# Patient Record
Sex: MALE | Race: WHITE | URBAN - METROPOLITAN AREA
[De-identification: names, ages, dates, MRNs, and addresses within clinical notes are randomized per-mention and may not be internally consistent; named-entity substitution may affect disease eponyms.]

---

## 2019-12-10 ENCOUNTER — Encounter (OUTPATIENT)
Dept: URBAN - METROPOLITAN AREA CLINIC 71 | Facility: CLINIC | Age: 74
End: 2019-12-10
Payer: MEDICARE

## 2019-12-10 PROCEDURE — 92014 COMPRE OPH EXAM EST PT 1/>: CPT | Performed by: OPHTHALMOLOGY

## 2019-12-10 PROCEDURE — 92250 FUNDUS PHOTOGRAPHY W/I&R: CPT | Performed by: OPHTHALMOLOGY

## 2020-03-10 ENCOUNTER — OFFICE VISIT (OUTPATIENT)
Dept: URBAN - METROPOLITAN AREA CLINIC 71 | Facility: CLINIC | Age: 75
End: 2020-03-10
Payer: MEDICARE

## 2020-03-10 PROCEDURE — 92014 COMPRE OPH EXAM EST PT 1/>: CPT | Performed by: OPHTHALMOLOGY

## 2020-03-10 ASSESSMENT — INTRAOCULAR PRESSURE
OD: 12
OS: 14

## 2020-03-10 NOTE — IMPRESSION/PLAN
Impression: Age-related nuclear cataract, bilateral: H25.13. Stable. Plan: No recommendation for surgery at this time. Continue to monitor annually for changes and progression.

## 2020-03-10 NOTE — IMPRESSION/PLAN
Impression: Vitreous degeneration, bilateral: H43.813. Stable. Plan: The PVD is stable, and there is no evidence of a retinal tear or detachment on dilated exam with scleral depression. I again reviewed the signs and symptoms of a retinal tear and detachment in detail with the patient, including worsening flashes, new floaters, and development of a shadow/curtain in the peripheral visual field. The patient was advised to call immediately with any changes to [address] [facility] or increase in symptoms.

## 2021-03-02 ENCOUNTER — OFFICE VISIT (OUTPATIENT)
Dept: URBAN - METROPOLITAN AREA CLINIC 71 | Facility: CLINIC | Age: 76
End: 2021-03-02
Payer: MEDICARE

## 2021-03-02 DIAGNOSIS — H43.813 VITREOUS DEGENERATION, BILATERAL: Primary | ICD-10-CM

## 2021-03-02 PROCEDURE — 99212 OFFICE O/P EST SF 10 MIN: CPT | Performed by: OPHTHALMOLOGY

## 2021-03-02 PROCEDURE — 92134 CPTRZ OPH DX IMG PST SGM RTA: CPT | Performed by: OPHTHALMOLOGY

## 2021-03-02 ASSESSMENT — INTRAOCULAR PRESSURE
OS: 10
OD: 9

## 2021-03-02 ASSESSMENT — KERATOMETRY
OS: 43.75
OD: 43.63

## 2021-03-02 NOTE — IMPRESSION/PLAN
Impression: Vitreous degeneration, bilateral: H43.813. OCT ordered and reviewed today. Stable. No holes or tears seen today. Plan: Patient instructed to call if any signs or symptoms of retinal detachment occur. Return annually for dilated exams with OCT testing.

## 2021-03-02 NOTE — IMPRESSION/PLAN
Impression: Age-related nuclear cataract, bilateral: H25.13. Stable. Plan: Continue to monitor for progression. Patient to call if changes or  decrease in vision occurs.

## 2022-03-29 ENCOUNTER — OFFICE VISIT (OUTPATIENT)
Dept: URBAN - METROPOLITAN AREA CLINIC 71 | Facility: CLINIC | Age: 77
End: 2022-03-29
Payer: MEDICARE

## 2022-03-29 DIAGNOSIS — H35.3121 NEXDTVE AGE-RELATED MCLR DEGN, LEFT EYE, EARLY DRY STAGE: ICD-10-CM

## 2022-03-29 DIAGNOSIS — H25.13 AGE-RELATED NUCLEAR CATARACT, BILATERAL: ICD-10-CM

## 2022-03-29 PROCEDURE — 92134 CPTRZ OPH DX IMG PST SGM RTA: CPT | Performed by: OPHTHALMOLOGY

## 2022-03-29 PROCEDURE — 99213 OFFICE O/P EST LOW 20 MIN: CPT | Performed by: OPHTHALMOLOGY

## 2022-03-29 RX ORDER — IRBESARTAN 150 MG/1
150 MG TABLET, FILM COATED ORAL
Qty: 0 | Refills: 0 | Status: ACTIVE
Start: 2022-03-29

## 2022-03-29 ASSESSMENT — INTRAOCULAR PRESSURE
OS: 12
OD: 11

## 2022-03-29 NOTE — IMPRESSION/PLAN
Impression: Nexdtve age-related mclr degn, left eye, early dry stage: H35.3121. New diagnosis. No leakage seen today. Plan: Recommend use of ocular vitamins and monitoring of the amsler grid. Patient to call if any changes in vision or distortions occur. Return in 6 months for dilation and repeat OCT testing.

## 2022-03-29 NOTE — IMPRESSION/PLAN
Impression: Vitreous degeneration, bilateral: H43.813. OCT ordered and reviewed today. Stable. No holes or tears seen today. Plan: Contact office if symptoms worsen, including increased floaters, flashing light, loss of vision or a black curtain blocking your field of vision.

## 2022-09-28 ENCOUNTER — OFFICE VISIT (OUTPATIENT)
Dept: URBAN - METROPOLITAN AREA CLINIC 71 | Facility: CLINIC | Age: 77
End: 2022-09-28
Payer: MEDICARE

## 2022-09-28 DIAGNOSIS — H35.3121 NONEXUDATIVE AGE-RELATED MACULAR DEGENERATION, LEFT EYE, EARLY DRY STAGE: Primary | ICD-10-CM

## 2022-09-28 DIAGNOSIS — H25.11 AGE-RELATED NUCLEAR CATARACT, RIGHT EYE: ICD-10-CM

## 2022-09-28 DIAGNOSIS — H25.812 COMBINED FORMS OF AGE-RELATED CATARACT, LEFT EYE: ICD-10-CM

## 2022-09-28 DIAGNOSIS — H53.453 BILATERAL PERIPHERAL VISUAL FIELD DEFECTS: ICD-10-CM

## 2022-09-28 DIAGNOSIS — H35.373 PUCKERING OF MACULA, BILATERAL: ICD-10-CM

## 2022-09-28 PROCEDURE — 92133 CPTRZD OPH DX IMG PST SGM ON: CPT | Performed by: PHYSICIAN ASSISTANT

## 2022-09-28 PROCEDURE — 92134 CPTRZ OPH DX IMG PST SGM RTA: CPT | Performed by: PHYSICIAN ASSISTANT

## 2022-09-28 PROCEDURE — 92014 COMPRE OPH EXAM EST PT 1/>: CPT | Performed by: PHYSICIAN ASSISTANT

## 2022-09-28 ASSESSMENT — INTRAOCULAR PRESSURE
OD: 10
OS: 9

## 2022-09-28 NOTE — IMPRESSION/PLAN
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 Pt has noted loss of temporal vision that is gradually improving -- suspect may have damage to optic nerves at optic chiasm -- will return in 1-2 weeks for vf. Answered all questions and addressed all concerns.

## 2022-09-28 NOTE — IMPRESSION/PLAN
Impression: Nonexudative age-related macular degeneration, left eye, early dry stage: H35.3121. Plan: Macular degeneration, dry type with stable vision. Will continue to monitor vision and the patient has been instructed to call with any vision changes. Continue AREDS2 vitamins and use of [employer] Grid at home. Answered all questions and addressed all concerns. 

OCT ordered today -- dry OU

## 2022-09-28 NOTE — IMPRESSION/PLAN
Impression: Puckering of macula, bilateral: H35.373. Plan: Discussed that patient has ERM/macular pucker and that this can impact vision. No need for treatment at this time. Will continue to monitor. Answered all questions and addressed all concerns.

## 2022-09-28 NOTE — IMPRESSION/PLAN
Impression: Age-related nuclear cataract, right eye: H25.11. Plan: Cataracts account for the patient's complaints. Discussed formation of cataracts and progression. No treatment currently recommended. The patient will monitor vision changes and contact us with any decrease in vision. Answered all questions and addressed all concerns.

## 2022-09-28 NOTE — IMPRESSION/PLAN
Impression: Combined forms of age-related cataract, left eye: H25.812. Plan: Cataracts account for the patient's complaints. Discussed formation of cataracts and progression. No treatment currently recommended. The patient will monitor vision changes and contact us with any decrease in vision. Answered all questions and addressed all concerns.

## 2022-10-12 ENCOUNTER — OFFICE VISIT (OUTPATIENT)
Dept: URBAN - METROPOLITAN AREA CLINIC 71 | Facility: CLINIC | Age: 77
End: 2022-10-12
Payer: MEDICARE

## 2022-10-12 DIAGNOSIS — H35.3121 NEXDTVE AGE-RELATED MCLR DEGN, LEFT EYE, EARLY DRY STAGE: ICD-10-CM

## 2022-10-12 DIAGNOSIS — H53.453 OTHER LOCALIZED VISUAL FIELD DEFECT, BILATERAL: Primary | ICD-10-CM

## 2022-10-12 DIAGNOSIS — H25.11 AGE-RELATED NUCLEAR CATARACT, RIGHT EYE: ICD-10-CM

## 2022-10-12 DIAGNOSIS — H25.812 COMBINED FORMS OF AGE-RELATED CATARACT, LEFT EYE: ICD-10-CM

## 2022-10-12 DIAGNOSIS — H35.373 PUCKERING OF MACULA, BILATERAL: ICD-10-CM

## 2022-10-12 PROCEDURE — 92083 EXTENDED VISUAL FIELD XM: CPT | Performed by: OPHTHALMOLOGY

## 2022-10-12 PROCEDURE — 99212 OFFICE O/P EST SF 10 MIN: CPT | Performed by: OPHTHALMOLOGY

## 2022-10-12 NOTE — IMPRESSION/PLAN
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 VF ordered today and reviewed. VF is WNL OU. No evidence of optic nerve damage. Plan: Will monitor.

## 2022-10-12 NOTE — IMPRESSION/PLAN
Impression: Nexdtve age-related mclr degn, left eye, early dry stage: H35.3121. Plan: Return as scheduled.

## 2022-10-12 NOTE — IMPRESSION/PLAN
Impression: Puckering of macula, bilateral: H35.373. Plan: Monitor. Return as scheduled in March for a dilated exam with OCT testing.

## 2023-03-29 ENCOUNTER — OFFICE VISIT (OUTPATIENT)
Dept: URBAN - METROPOLITAN AREA CLINIC 71 | Facility: CLINIC | Age: 78
End: 2023-03-29
Payer: MEDICARE

## 2023-03-29 DIAGNOSIS — H43.813 VITREOUS DEGENERATION, BILATERAL: ICD-10-CM

## 2023-03-29 DIAGNOSIS — H25.13 AGE-RELATED NUCLEAR CATARACT, BILATERAL: ICD-10-CM

## 2023-03-29 DIAGNOSIS — H35.3121 NEXDTVE AGE-RELATED MCLR DEGN, LEFT EYE, EARLY DRY STAGE: Primary | ICD-10-CM

## 2023-03-29 PROCEDURE — 99213 OFFICE O/P EST LOW 20 MIN: CPT | Performed by: OPHTHALMOLOGY

## 2023-03-29 PROCEDURE — 92134 CPTRZ OPH DX IMG PST SGM RTA: CPT | Performed by: OPHTHALMOLOGY

## 2023-03-29 ASSESSMENT — INTRAOCULAR PRESSURE
OS: 11
OD: 12

## 2023-03-29 NOTE — IMPRESSION/PLAN
Impression: Nexdtve age-related mclr degn, left eye, early dry stage: H35.3121. OCT ordered and reviewed today. No leakage seen today. Plan: Recommend use of ocular vitamins and monitoring of the amsler grid. Patient to call if any changes in vision or distortions occur. Return in 6 months for dilation and repeat OCT testing.

## 2023-03-29 NOTE — IMPRESSION/PLAN
Impression: Vitreous degeneration, bilateral: H43.813. No holes or tears seen today. stable. Plan: Contact office if symptoms worsen, including increased floaters, flashing light, loss of vision or a black curtain blocking your field of vision.

## 2024-01-29 ENCOUNTER — OFFICE VISIT (OUTPATIENT)
Dept: URBAN - METROPOLITAN AREA CLINIC 71 | Facility: CLINIC | Age: 79
End: 2024-01-29
Payer: MEDICARE

## 2024-01-29 DIAGNOSIS — H25.13 AGE-RELATED NUCLEAR CATARACT, BILATERAL: ICD-10-CM

## 2024-01-29 DIAGNOSIS — H43.813 VITREOUS DEGENERATION, BILATERAL: ICD-10-CM

## 2024-01-29 DIAGNOSIS — H35.3121 NEXDTVE AGE-RELATED MCLR DEGN, LEFT EYE, EARLY DRY STAGE: Primary | ICD-10-CM

## 2024-01-29 PROCEDURE — 92133 CPTRZD OPH DX IMG PST SGM ON: CPT | Performed by: OPHTHALMOLOGY

## 2024-01-29 PROCEDURE — 99213 OFFICE O/P EST LOW 20 MIN: CPT | Performed by: OPHTHALMOLOGY

## 2024-01-29 PROCEDURE — 92134 CPTRZ OPH DX IMG PST SGM RTA: CPT | Performed by: OPHTHALMOLOGY

## 2024-01-29 ASSESSMENT — INTRAOCULAR PRESSURE
OS: 11
OD: 13

## 2025-06-12 ENCOUNTER — OFFICE VISIT (OUTPATIENT)
Dept: URBAN - METROPOLITAN AREA CLINIC 71 | Facility: CLINIC | Age: 80
End: 2025-06-12
Payer: MEDICARE

## 2025-06-12 DIAGNOSIS — H43.813 VITREOUS DEGENERATION, BILATERAL: ICD-10-CM

## 2025-06-12 DIAGNOSIS — H25.13 AGE-RELATED NUCLEAR CATARACT, BILATERAL: ICD-10-CM

## 2025-06-12 DIAGNOSIS — H35.3121 NONEXUDATIVE AGE-RELATED MACULAR DEGENERATION, LEFT EYE, EARLY DRY STAGE: Primary | ICD-10-CM

## 2025-06-12 DIAGNOSIS — H35.373 PUCKERING OF MACULA, BILATERAL: ICD-10-CM

## 2025-06-12 PROCEDURE — 92133 CPTRZD OPH DX IMG PST SGM ON: CPT | Performed by: OPHTHALMOLOGY

## 2025-06-12 PROCEDURE — 92134 CPTRZ OPH DX IMG PST SGM RTA: CPT | Performed by: OPHTHALMOLOGY

## 2025-06-12 PROCEDURE — 99213 OFFICE O/P EST LOW 20 MIN: CPT | Performed by: OPHTHALMOLOGY

## 2025-06-12 ASSESSMENT — INTRAOCULAR PRESSURE
OD: 11
OS: 12